# Patient Record
Sex: FEMALE | Race: OTHER | Employment: FULL TIME | URBAN - METROPOLITAN AREA
[De-identification: names, ages, dates, MRNs, and addresses within clinical notes are randomized per-mention and may not be internally consistent; named-entity substitution may affect disease eponyms.]

---

## 2019-09-09 PROCEDURE — 99283 EMERGENCY DEPT VISIT LOW MDM: CPT | Performed by: EMERGENCY MEDICINE

## 2019-09-10 ENCOUNTER — HOSPITAL ENCOUNTER (EMERGENCY)
Age: 24
Discharge: HOME | End: 2019-09-10
Attending: EMERGENCY MEDICINE
Payer: OTHER MISCELLANEOUS

## 2019-09-10 VITALS
OXYGEN SATURATION: 100 % | DIASTOLIC BLOOD PRESSURE: 75 MMHG | RESPIRATION RATE: 17 BRPM | WEIGHT: 185 LBS | BODY MASS INDEX: 32.78 KG/M2 | HEART RATE: 81 BPM | HEIGHT: 63 IN | TEMPERATURE: 98.4 F | SYSTOLIC BLOOD PRESSURE: 119 MMHG

## 2019-09-10 DIAGNOSIS — T20.40XA CHEMICAL BURN OF FACE, INITIAL ENCOUNTER: Primary | ICD-10-CM

## 2019-09-10 PROCEDURE — 74011000250 HC RX REV CODE- 250: Performed by: EMERGENCY MEDICINE

## 2019-09-10 RX ORDER — TRAMADOL HYDROCHLORIDE 50 MG/1
100 TABLET ORAL
Qty: 19 TAB | Refills: 0 | Status: SHIPPED | OUTPATIENT
Start: 2019-09-10 | End: 2019-09-15

## 2019-09-10 RX ADMIN — NEOMYCIN-BACITRACIN-POLYMYXIN OINT 1 PACKET: OINTMENT at 01:00

## 2019-09-10 NOTE — LETTER
[address] [facility] EMERGENCY DEPT 
[address] [facility] 
[phone] Work/School Note Date: 9/9/2019 To Whom It May concern: 
 
Michelle Hutchins was seen and treated today in the emergency room by the following provider(s): 
Attending Provider: Debbie Ashley MD.   
 
Michelle Hutchins will return to work on 9/10/2019 Sincerely, Nori Peabody, MD

## 2019-09-10 NOTE — PROGRESS NOTES
present over the phone for assessment with Dr. Nuha Rivers. Thank you,      Adrian Jin, [address] [employer] [address] /  [email or web address] c: [phone] / [address] / [place], [address] [facility]  [email or web address]
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
## 2019-09-10 NOTE — ED NOTES
I have reviewed discharge instructions with the patient. The patient verbalized understanding. Patient left ED via Discharge Method: ambulatory to Home with self. Opportunity for questions and clarification provided. Patient given 1 scripts. To continue your aftercare when you leave the hospital, you may receive an automated call from our care team to check in on how you are doing. This is a free service and part of our promise to provide the best care and service to meet your aftercare needs.  If you have questions, or wish to unsubscribe from this service please call [phone]. Thank you for Choosing our [employer] Emergency Department.

## 2019-09-10 NOTE — PROGRESS NOTES
available from 4:30 p.m. - 1:00 a.m. Please call Nelli at [phone] with any interpreting requests. Thank you,      Sanam Coburn, [address] [address] /  [email or web address] c: [phone] / [address] [facility] [address] [facility] 63 / [place], [address] [facility]  [email or web address]

## 2019-09-10 NOTE — DISCHARGE INSTRUCTIONS
Use antibiotic ointment on all burn areas  Cover as needed  Use Motrin and Tylenol for mild to moderate pain  Use the prescription pain pill as needed if the pain is more severe  Do not drink alcohol or drive while taking the prescription pain medications  Return to ER for any worsening symptoms or new problems which may arise
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
## 2022-09-17 ENCOUNTER — APPOINTMENT (OUTPATIENT)
Dept: GENERAL RADIOLOGY | Age: 27
End: 2022-09-17
Payer: OTHER MISCELLANEOUS

## 2022-09-17 ENCOUNTER — HOSPITAL ENCOUNTER (EMERGENCY)
Age: 27
Discharge: LEFT BEFORE DISCHARGE | End: 2022-09-18
Attending: GENERAL PRACTICE
Payer: OTHER MISCELLANEOUS

## 2022-09-17 ENCOUNTER — APPOINTMENT (OUTPATIENT)
Dept: CT IMAGING | Age: 27
End: 2022-09-17
Payer: OTHER MISCELLANEOUS

## 2022-09-17 VITALS
TEMPERATURE: 98.8 F | RESPIRATION RATE: 17 BRPM | WEIGHT: 210 LBS | BODY MASS INDEX: 37.21 KG/M2 | DIASTOLIC BLOOD PRESSURE: 73 MMHG | HEIGHT: 63 IN | HEART RATE: 93 BPM | OXYGEN SATURATION: 99 % | SYSTOLIC BLOOD PRESSURE: 120 MMHG

## 2022-09-17 DIAGNOSIS — S26.91XA CONTUSION OF HEART, INITIAL ENCOUNTER: Primary | ICD-10-CM

## 2022-09-17 DIAGNOSIS — R77.8 TROPONIN LEVEL ELEVATED: ICD-10-CM

## 2022-09-17 LAB
ALBUMIN SERPL-MCNC: 3.5 G/DL (ref 3.5–5)
ALBUMIN/GLOB SERPL: 0.9 {RATIO} (ref 1.2–3.5)
ALP SERPL-CCNC: 76 U/L (ref 50–136)
ALT SERPL-CCNC: 15 U/L (ref 12–65)
ANION GAP SERPL CALC-SCNC: 7 MMOL/L (ref 4–13)
AST SERPL-CCNC: 17 U/L (ref 15–37)
BILIRUB SERPL-MCNC: 0.3 MG/DL (ref 0.2–1.1)
BUN SERPL-MCNC: 9 MG/DL (ref 6–23)
CALCIUM SERPL-MCNC: 9.2 MG/DL (ref 8.3–10.4)
CHLORIDE SERPL-SCNC: 106 MMOL/L (ref 101–110)
CO2 SERPL-SCNC: 26 MMOL/L (ref 21–32)
CREAT SERPL-MCNC: 0.6 MG/DL (ref 0.6–1)
EKG ATRIAL RATE: 109 BPM
EKG DIAGNOSIS: NORMAL
EKG P AXIS: 59 DEGREES
EKG P-R INTERVAL: 154 MS
EKG Q-T INTERVAL: 334 MS
EKG QRS DURATION: 86 MS
EKG QTC CALCULATION (BAZETT): 449 MS
EKG R AXIS: 97 DEGREES
EKG T AXIS: 48 DEGREES
EKG VENTRICULAR RATE: 109 BPM
ERYTHROCYTE [DISTWIDTH] IN BLOOD BY AUTOMATED COUNT: 17.2 % (ref 11.9–14.6)
GLOBULIN SER CALC-MCNC: 3.8 G/DL (ref 2.3–3.5)
GLUCOSE SERPL-MCNC: 122 MG/DL (ref 65–100)
HCT VFR BLD AUTO: 37.2 % (ref 35.8–46.3)
HGB BLD-MCNC: 11.4 G/DL (ref 11.7–15.4)
MCH RBC QN AUTO: 22.7 PG (ref 26.1–32.9)
MCHC RBC AUTO-ENTMCNC: 30.6 G/DL (ref 31.4–35)
MCV RBC AUTO: 74 FL (ref 79.6–97.8)
NRBC # BLD: 0 K/UL (ref 0–0.2)
PLATELET # BLD AUTO: 388 K/UL (ref 150–450)
PMV BLD AUTO: 10 FL (ref 9.4–12.3)
POTASSIUM SERPL-SCNC: 4 MMOL/L (ref 3.5–5.1)
PROT SERPL-MCNC: 7.3 G/DL (ref 6.3–8.2)
RBC # BLD AUTO: 5.03 M/UL (ref 4.05–5.2)
SODIUM SERPL-SCNC: 139 MMOL/L (ref 136–145)
TROPONIN I SERPL HS-MCNC: 53.6 PG/ML (ref 0–14)
TROPONIN I SERPL HS-MCNC: 57.2 PG/ML (ref 0–14)
WBC # BLD AUTO: 12.1 K/UL (ref 4.3–11.1)

## 2022-09-17 PROCEDURE — 85027 COMPLETE CBC AUTOMATED: CPT

## 2022-09-17 PROCEDURE — 71046 X-RAY EXAM CHEST 2 VIEWS: CPT

## 2022-09-17 PROCEDURE — 72040 X-RAY EXAM NECK SPINE 2-3 VW: CPT

## 2022-09-17 PROCEDURE — 6360000004 HC RX CONTRAST MEDICATION: Performed by: GENERAL PRACTICE

## 2022-09-17 PROCEDURE — 80053 COMPREHEN METABOLIC PANEL: CPT

## 2022-09-17 PROCEDURE — 84484 ASSAY OF TROPONIN QUANT: CPT

## 2022-09-17 PROCEDURE — 4500000002 HC ER NO CHARGE

## 2022-09-17 PROCEDURE — 93005 ELECTROCARDIOGRAM TRACING: CPT | Performed by: STUDENT IN AN ORGANIZED HEALTH CARE EDUCATION/TRAINING PROGRAM

## 2022-09-17 PROCEDURE — 71260 CT THORAX DX C+: CPT

## 2022-09-17 RX ADMIN — IOPAMIDOL 100 ML: 755 INJECTION, SOLUTION INTRAVENOUS at 15:01

## 2022-09-17 ASSESSMENT — PAIN - FUNCTIONAL ASSESSMENT: PAIN_FUNCTIONAL_ASSESSMENT: 0-10

## 2022-09-17 ASSESSMENT — PAIN DESCRIPTION - LOCATION: LOCATION: CHEST

## 2022-09-17 ASSESSMENT — PAIN DESCRIPTION - DESCRIPTORS: DESCRIPTORS: PRESSURE

## 2022-09-17 ASSESSMENT — PAIN SCALES - GENERAL: PAINLEVEL_OUTOF10: 9

## 2022-09-17 NOTE — ED NOTES
Patient left against medical advice. MD explained risks to patient and patient states understanding. Patient gait steady states understanding to return with any concerns.       Cyrus Tran RN  09/17/22 [id number]
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
